# Patient Record
Sex: FEMALE | URBAN - METROPOLITAN AREA
[De-identification: names, ages, dates, MRNs, and addresses within clinical notes are randomized per-mention and may not be internally consistent; named-entity substitution may affect disease eponyms.]

---

## 2017-12-29 ENCOUNTER — INPATIENT (INPATIENT)
Facility: HOSPITAL | Age: 32
LOS: 2 days | Discharge: HOME | End: 2018-01-01
Attending: SPECIALIST | Admitting: SPECIALIST
Payer: COMMERCIAL

## 2017-12-29 VITALS — WEIGHT: 170.42 LBS | HEIGHT: 64 IN

## 2017-12-29 DIAGNOSIS — O26.899 OTHER SPECIFIED PREGNANCY RELATED CONDITIONS, UNSPECIFIED TRIMESTER: ICD-10-CM

## 2017-12-29 DIAGNOSIS — Z3A.00 WEEKS OF GESTATION OF PREGNANCY NOT SPECIFIED: ICD-10-CM

## 2017-12-29 LAB
BASOPHILS NFR BLD AUTO: 0.2 % — SIGNIFICANT CHANGE UP (ref 0–2)
BLD GP AB SCN SERPL QL: NEGATIVE — SIGNIFICANT CHANGE UP
EOSINOPHIL NFR BLD AUTO: 0.2 % — SIGNIFICANT CHANGE UP (ref 0–6)
HCT VFR BLD CALC: 31.6 % — LOW (ref 34.5–45)
HGB BLD-MCNC: 10.8 G/DL — LOW (ref 11.5–15.5)
LYMPHOCYTES # BLD AUTO: 21 % — SIGNIFICANT CHANGE UP (ref 13–44)
MCHC RBC-ENTMCNC: 31.5 PG — SIGNIFICANT CHANGE UP (ref 27–34)
MCHC RBC-ENTMCNC: 34.2 G/DL — SIGNIFICANT CHANGE UP (ref 32–36)
MCV RBC AUTO: 92.1 FL — SIGNIFICANT CHANGE UP (ref 80–100)
MONOCYTES NFR BLD AUTO: 6.6 % — SIGNIFICANT CHANGE UP (ref 2–14)
NEUTROPHILS NFR BLD AUTO: 72 % — SIGNIFICANT CHANGE UP (ref 43–77)
PLATELET # BLD AUTO: 121 K/UL — LOW (ref 150–400)
RBC # BLD: 3.43 M/UL — LOW (ref 3.8–5.2)
RBC # FLD: 12.6 % — SIGNIFICANT CHANGE UP (ref 10.3–16.9)
RH IG SCN BLD-IMP: POSITIVE — SIGNIFICANT CHANGE UP
RH IG SCN BLD-IMP: POSITIVE — SIGNIFICANT CHANGE UP
WBC # BLD: 6.2 K/UL — SIGNIFICANT CHANGE UP (ref 3.8–10.5)
WBC # FLD AUTO: 6.2 K/UL — SIGNIFICANT CHANGE UP (ref 3.8–10.5)

## 2017-12-29 RX ORDER — CITRIC ACID/SODIUM CITRATE 300-500 MG
15 SOLUTION, ORAL ORAL EVERY 4 HOURS
Refills: 0 | Status: DISCONTINUED | OUTPATIENT
Start: 2017-12-29 | End: 2017-12-30

## 2017-12-29 RX ORDER — OXYTOCIN 10 UNIT/ML
333.33 VIAL (ML) INJECTION
Qty: 20 | Refills: 0 | Status: COMPLETED | OUTPATIENT
Start: 2017-12-29

## 2017-12-29 RX ORDER — SODIUM CHLORIDE 9 MG/ML
1000 INJECTION, SOLUTION INTRAVENOUS ONCE
Refills: 0 | Status: COMPLETED | OUTPATIENT
Start: 2017-12-29 | End: 2017-12-29

## 2017-12-29 RX ORDER — SODIUM CHLORIDE 9 MG/ML
1000 INJECTION, SOLUTION INTRAVENOUS
Refills: 0 | Status: DISCONTINUED | OUTPATIENT
Start: 2017-12-29 | End: 2017-12-30

## 2017-12-29 RX ADMIN — SODIUM CHLORIDE 125 MILLILITER(S): 9 INJECTION, SOLUTION INTRAVENOUS at 18:48

## 2017-12-29 RX ADMIN — SODIUM CHLORIDE 2000 MILLILITER(S): 9 INJECTION, SOLUTION INTRAVENOUS at 21:40

## 2017-12-30 RX ORDER — TETANUS TOXOID, REDUCED DIPHTHERIA TOXOID AND ACELLULAR PERTUSSIS VACCINE, ADSORBED 5; 2.5; 8; 8; 2.5 [IU]/.5ML; [IU]/.5ML; UG/.5ML; UG/.5ML; UG/.5ML
0.5 SUSPENSION INTRAMUSCULAR ONCE
Refills: 0 | Status: DISCONTINUED | OUTPATIENT
Start: 2017-12-30 | End: 2018-01-01

## 2017-12-30 RX ORDER — IBUPROFEN 200 MG
600 TABLET ORAL EVERY 6 HOURS
Refills: 0 | Status: DISCONTINUED | OUTPATIENT
Start: 2017-12-30 | End: 2018-01-01

## 2017-12-30 RX ORDER — LANOLIN
1 OINTMENT (GRAM) TOPICAL EVERY 6 HOURS
Refills: 0 | Status: DISCONTINUED | OUTPATIENT
Start: 2017-12-30 | End: 2018-01-01

## 2017-12-30 RX ORDER — BENZOCAINE 10 %
1 GEL (GRAM) MUCOUS MEMBRANE EVERY 6 HOURS
Refills: 0 | Status: DISCONTINUED | OUTPATIENT
Start: 2017-12-30 | End: 2018-01-01

## 2017-12-30 RX ORDER — MAGNESIUM HYDROXIDE 400 MG/1
30 TABLET, CHEWABLE ORAL
Refills: 0 | Status: DISCONTINUED | OUTPATIENT
Start: 2017-12-30 | End: 2018-01-01

## 2017-12-30 RX ORDER — DIPHENHYDRAMINE HCL 50 MG
25 CAPSULE ORAL EVERY 6 HOURS
Refills: 0 | Status: DISCONTINUED | OUTPATIENT
Start: 2017-12-30 | End: 2018-01-01

## 2017-12-30 RX ORDER — OXYTOCIN 10 UNIT/ML
333.33 VIAL (ML) INJECTION
Qty: 20 | Refills: 0 | Status: DISCONTINUED | OUTPATIENT
Start: 2017-12-30 | End: 2018-01-01

## 2017-12-30 RX ORDER — GLYCERIN ADULT
1 SUPPOSITORY, RECTAL RECTAL AT BEDTIME
Refills: 0 | Status: DISCONTINUED | OUTPATIENT
Start: 2017-12-30 | End: 2018-01-01

## 2017-12-30 RX ORDER — ACETAMINOPHEN 500 MG
650 TABLET ORAL EVERY 6 HOURS
Refills: 0 | Status: DISCONTINUED | OUTPATIENT
Start: 2017-12-30 | End: 2018-01-01

## 2017-12-30 RX ORDER — DOCUSATE SODIUM 100 MG
100 CAPSULE ORAL
Refills: 0 | Status: DISCONTINUED | OUTPATIENT
Start: 2017-12-30 | End: 2018-01-01

## 2017-12-30 RX ORDER — OXYCODONE AND ACETAMINOPHEN 5; 325 MG/1; MG/1
2 TABLET ORAL EVERY 6 HOURS
Refills: 0 | Status: DISCONTINUED | OUTPATIENT
Start: 2017-12-30 | End: 2018-01-01

## 2017-12-30 RX ORDER — AER TRAVELER 0.5 G/1
1 SOLUTION RECTAL; TOPICAL EVERY 4 HOURS
Refills: 0 | Status: DISCONTINUED | OUTPATIENT
Start: 2017-12-30 | End: 2018-01-01

## 2017-12-30 RX ORDER — SIMETHICONE 80 MG/1
80 TABLET, CHEWABLE ORAL EVERY 6 HOURS
Refills: 0 | Status: DISCONTINUED | OUTPATIENT
Start: 2017-12-30 | End: 2018-01-01

## 2017-12-30 RX ORDER — DIBUCAINE 1 %
1 OINTMENT (GRAM) RECTAL EVERY 4 HOURS
Refills: 0 | Status: DISCONTINUED | OUTPATIENT
Start: 2017-12-30 | End: 2018-01-01

## 2017-12-30 RX ORDER — SODIUM CHLORIDE 9 MG/ML
3 INJECTION INTRAMUSCULAR; INTRAVENOUS; SUBCUTANEOUS EVERY 8 HOURS
Refills: 0 | Status: DISCONTINUED | OUTPATIENT
Start: 2017-12-30 | End: 2018-01-01

## 2017-12-30 RX ORDER — OXYTOCIN 10 UNIT/ML
2 VIAL (ML) INJECTION
Qty: 30 | Refills: 0 | Status: DISCONTINUED | OUTPATIENT
Start: 2017-12-30 | End: 2018-01-01

## 2017-12-30 RX ORDER — HYDROCORTISONE 1 %
1 OINTMENT (GRAM) TOPICAL EVERY 4 HOURS
Refills: 0 | Status: DISCONTINUED | OUTPATIENT
Start: 2017-12-30 | End: 2018-01-01

## 2017-12-30 RX ORDER — OXYTOCIN 10 UNIT/ML
41.67 VIAL (ML) INJECTION
Qty: 20 | Refills: 0 | Status: DISCONTINUED | OUTPATIENT
Start: 2017-12-30 | End: 2018-01-01

## 2017-12-30 RX ADMIN — Medication 600 MILLIGRAM(S): at 18:15

## 2017-12-30 RX ADMIN — Medication 600 MILLIGRAM(S): at 22:29

## 2017-12-30 RX ADMIN — SODIUM CHLORIDE 3 MILLILITER(S): 9 INJECTION INTRAMUSCULAR; INTRAVENOUS; SUBCUTANEOUS at 21:38

## 2017-12-30 RX ADMIN — SODIUM CHLORIDE 125 MILLILITER(S): 9 INJECTION, SOLUTION INTRAVENOUS at 08:34

## 2017-12-30 RX ADMIN — Medication 600 MILLIGRAM(S): at 16:55

## 2017-12-30 RX ADMIN — Medication 2 MILLIUNIT(S)/MIN: at 08:51

## 2017-12-30 RX ADMIN — Medication 600 MILLIGRAM(S): at 23:19

## 2017-12-30 NOTE — DISCHARGE NOTE OB - CARE PROVIDER_API CALL
Meron Leal), Obstetrics and Gynecology  [address] [place] [address]  Phone: [phone]  Fax: [phone] eMron Leal), Obstetrics and Gynecology  [address] [place] [address]  Phone: [phone]  Fax: [phone]

## 2017-12-30 NOTE — DISCHARGE NOTE OB - CARE PLAN
Principal Discharge DX:	40 weeks gestation of pregnancy  Goal:	be happy  Instructions for follow-up, activity and diet:	breastfeed, ambulate, regular diet

## 2017-12-31 LAB — T PALLIDUM AB TITR SER: NEGATIVE — SIGNIFICANT CHANGE UP

## 2017-12-31 RX ADMIN — Medication 100 MILLIGRAM(S): at 21:01

## 2017-12-31 RX ADMIN — Medication 600 MILLIGRAM(S): at 12:36

## 2017-12-31 RX ADMIN — Medication 600 MILLIGRAM(S): at 07:00

## 2017-12-31 RX ADMIN — Medication 600 MILLIGRAM(S): at 06:03

## 2017-12-31 RX ADMIN — SODIUM CHLORIDE 3 MILLILITER(S): 9 INJECTION INTRAMUSCULAR; INTRAVENOUS; SUBCUTANEOUS at 13:03

## 2017-12-31 RX ADMIN — Medication 100 MILLIGRAM(S): at 12:35

## 2017-12-31 RX ADMIN — Medication 1 TABLET(S): at 12:35

## 2017-12-31 RX ADMIN — Medication 600 MILLIGRAM(S): at 13:30

## 2017-12-31 RX ADMIN — SODIUM CHLORIDE 3 MILLILITER(S): 9 INJECTION INTRAMUSCULAR; INTRAVENOUS; SUBCUTANEOUS at 06:10

## 2017-12-31 RX ADMIN — Medication 600 MILLIGRAM(S): at 19:29

## 2017-12-31 RX ADMIN — Medication 600 MILLIGRAM(S): at 18:26

## 2017-12-31 NOTE — LACTATION INITIAL EVALUATION - NIPPLE ASSESSMENT (LEFT)
Nipples are shorter/less elastic but toma well with stimulation and stay everted for baby to latch deeply. Reviewed good skin hygiene and maintenance b/w feeds and encouraged a deep effective latch at every feed./medium/normal/dry and intact/compressible/tender/sore

## 2018-01-01 VITALS
DIASTOLIC BLOOD PRESSURE: 74 MMHG | TEMPERATURE: 98 F | OXYGEN SATURATION: 97 % | SYSTOLIC BLOOD PRESSURE: 108 MMHG | HEART RATE: 100 BPM | RESPIRATION RATE: 18 BRPM

## 2018-01-01 RX ADMIN — SODIUM CHLORIDE 3 MILLILITER(S): 9 INJECTION INTRAMUSCULAR; INTRAVENOUS; SUBCUTANEOUS at 08:29

## 2018-01-01 RX ADMIN — Medication 600 MILLIGRAM(S): at 01:55

## 2018-01-01 RX ADMIN — Medication 600 MILLIGRAM(S): at 13:45

## 2018-01-01 RX ADMIN — Medication 600 MILLIGRAM(S): at 00:55

## 2018-01-01 RX ADMIN — Medication 600 MILLIGRAM(S): at 07:57

## 2018-01-01 RX ADMIN — Medication 1 TABLET(S): at 13:51

## 2018-01-01 RX ADMIN — SODIUM CHLORIDE 3 MILLILITER(S): 9 INJECTION INTRAMUSCULAR; INTRAVENOUS; SUBCUTANEOUS at 06:29

## 2018-01-01 RX ADMIN — Medication 600 MILLIGRAM(S): at 13:51

## 2018-01-01 RX ADMIN — Medication 600 MILLIGRAM(S): at 06:55

## 2018-01-01 NOTE — PROGRESS NOTE ADULT - SUBJECTIVE AND OBJECTIVE BOX
Patient evaluated at bedside. No acute events overnight.  She reports pain is well controlled.  She denies heavy vaginal bleeding or perineal discomfort.  She has been ambulating without assistance, voiding spontaneously, and is breastfeeding.    Physical Exam:  T(C): 36.6 (12-31-17 @ 06:39), Max: 36.6 (12-30-17 @ 21:49)  HR: 92 (12-31-17 @ 06:39) (92 - 93)  BP: 95/64 (12-31-17 @ 06:39) (95/64 - 100/66)  RR: 18 (12-31-17 @ 06:39) (17 - 18)  SpO2: 98% (12-31-17 @ 06:39) (98% - 98%)  Wt(kg): --    General: no acute distress, AAO x3  Cardiovascular: RRR, normal S1 and S2, no murmurs, rubs, or gallops  Respiratory: no increased work of breathing, lungs clear to auscultation bilaterally  Abdomen: soft, nontender, nondistended, no rebound or guarding, uterus firm at midline, fundus below umbilicus  Genitourinary: lochia WNL  Extremities: no swelling or calf tenderness                          10.8   6.2   )-----------( 121      ( 29 Dec 2017 19:06 )             31.6
Patient evaluated at bedside.  No acute events overnight.  She reports pain is well controlled with OPM.  She denies heavy vaginal bleeding or perineal discomfort.  She has been ambulating without assistance, voiding spontaneously, and is breastfeeding.    Physical Exam:  T(C): 36.6 (01-01-18 @ 07:00), Max: 36.6 (01-01-18 @ 07:00)  HR: 100 (01-01-18 @ 07:00) (97 - 100)  BP: 108/74 (01-01-18 @ 07:00) (100/70 - 108/74)  RR: 18 (01-01-18 @ 07:00) (18 - 18)  SpO2: 97% (01-01-18 @ 07:00) (97% - 97%)  Wt(kg): --    GA: NAD, AAOx3  Abd: soft, nontender, nondistended, no rebound or guarding, uterus firm at midline,   fundus below umbilicus  : lochia WNL  Extremities: no swelling or calf tenderness

## 2018-01-01 NOTE — PROGRESS NOTE ADULT - ASSESSMENT
A/P  32y s/p , PPD #1, stable  1. Pain: well controlled on oral pain medications  2. GI: Regular diet  3. : voiding spontaneously  4. DVT prophylaxis: ambulation  5. Dispo: PPD# 2, unless otherwise specified
A/P  32y   s/p , PPD #2  ,stable  1. Pain: well controlled on OPM  2. GI: Regular diet  3. : voiding spontaneously  4. DVT prophylaxis: Ambulation  5. Dispo: PPD 2, unless otherwise specified

## 2018-01-01 NOTE — LACTATION INITIAL EVALUATION - NS LACT CON SNS
expressed human milk/breast massage/compression
expressed human milk/finger suck/breast massage/compression

## 2018-01-01 NOTE — LACTATION INITIAL EVALUATION - NS LACT CON REASON FOR REQ
Met Dyad and FOB. Pt reports "last night with breastfeeding was a struggle but today I feel like we are doing better". Taught bf'ing basics, position/latch, expected input/output. In a laid back position baby obtained a deep effective latch with rhythmic sucks and appropriate pauses on the R breast. Mom reports a comfortable pull of her nipple when baby is latched, denies pinching or pain. Baby spontaneously released mom's nipple at the end of the feed and fell asleep with relaxed body posture. Baby born  @ 1216, , 40 wks, G1. x3 voids/x3 poops since birth (breast and x1 10 cc formula feed the first night of life). Wt loss= 1.98%. Mom has expressible colostrum/transitional milk. Encouraged as much S2S time with baby aiming to FOD of baby making sure baby has 8-12 feeds per 24 hours. Monitor output and keep a log. Questions answered, parents reassured./c/o sore, painful nipples/primaparous mom
Parents report "baby was very fussy last night and did not want to come off of the breast. We did not sleep much". Explained Second Night Syndrome and cluster feeding to parents; verbalize understanding. Mom reports her breasts feel "heavier, longer and warmer today", so her mature milk appears to be filling. She reports her breasts are softer after the baby feeds. Reviewed I/O's with parents. Since I saw this Dyad yesterday, baby has had and increase of x2 voids/x1 poop (WNL for second 24 hours of life). Total of x4 voids/x4 poops since birth (breast only). Baby born  @ 1216, , 40 wks,  now. Wt loss=5.24%. Mom reports "today has been much better with feeding but last night was rough". Parents have lots of questions about  infant sleep. Encouraged baby wearing, and referred to [facility] and Dr. Clem Noble's website. Parents state "thank you so much we feel better now and are ready to go home". Questions answered, parents reassured. Will F/U with Pediatrician s/p D/C as instructed./primaparous mom

## 2018-01-01 NOTE — LACTATION INITIAL EVALUATION - LACTATION INTERVENTIONS
stimulate nutritive suck using/initiate skin to skin
stimulate nutritive suck using/initiate skin to skin

## 2018-01-01 NOTE — LACTATION INITIAL EVALUATION - INTERVENTION OUTCOME
verbalizes understanding/demonstrates understanding of teaching/good return demonstration/needs met
verbalizes understanding/demonstrates understanding of teaching/good return demonstration/needs met/discharge criteria met

## 2018-01-03 DIAGNOSIS — Z28.21 IMMUNIZATION NOT CARRIED OUT BECAUSE OF PATIENT REFUSAL: ICD-10-CM

## 2018-01-03 DIAGNOSIS — Z34.93 ENCOUNTER FOR SUPERVISION OF NORMAL PREGNANCY, UNSPECIFIED, THIRD TRIMESTER: ICD-10-CM

## 2018-01-03 DIAGNOSIS — Z3A.40 40 WEEKS GESTATION OF PREGNANCY: ICD-10-CM

## 2018-01-03 DIAGNOSIS — Z28.09 IMMUNIZATION NOT CARRIED OUT BECAUSE OF OTHER CONTRAINDICATION: ICD-10-CM

## 2018-01-03 DIAGNOSIS — Z79.899 OTHER LONG TERM (CURRENT) DRUG THERAPY: ICD-10-CM

## 2018-11-07 PROBLEM — Z00.00 ENCOUNTER FOR PREVENTIVE HEALTH EXAMINATION: Status: ACTIVE | Noted: 2018-11-07

## 2020-01-20 ENCOUNTER — LABORATORY RESULT (OUTPATIENT)
Age: 35
End: 2020-01-20

## 2020-01-21 ENCOUNTER — APPOINTMENT (OUTPATIENT)
Dept: ANTEPARTUM | Facility: CLINIC | Age: 35
End: 2020-01-21
Payer: COMMERCIAL

## 2020-01-21 PROCEDURE — 76801 OB US < 14 WKS SINGLE FETUS: CPT

## 2020-01-21 PROCEDURE — 76813 OB US NUCHAL MEAS 1 GEST: CPT

## 2020-02-13 NOTE — PATIENT PROFILE OB - MEDICAL/SURG HX
For Medical Surgical Hx obtained at Admission, Please see Provider H&P
D/C Home  D/W Dr. Ronquillo  No evidence of acute process at this time  Normal fetal testing  Follow up at next prenatal appointment on Friday at [facility]  Return for decreased fetal movement, loss of fluid or vaginal bleeding  Increase oral hydration  Kick counts reviewed

## 2020-03-18 ENCOUNTER — APPOINTMENT (OUTPATIENT)
Dept: ANTEPARTUM | Facility: CLINIC | Age: 35
End: 2020-03-18